# Patient Record
Sex: MALE
[De-identification: names, ages, dates, MRNs, and addresses within clinical notes are randomized per-mention and may not be internally consistent; named-entity substitution may affect disease eponyms.]

---

## 2020-01-10 ENCOUNTER — HOSPITAL ENCOUNTER (INPATIENT)
Dept: HOSPITAL 73 - ED | Age: 70
LOS: 1 days | Discharge: ANOTHER HOSPITAL | DRG: 65 | End: 2020-01-11
Payer: COMMERCIAL

## 2020-01-10 VITALS
HEART RATE: 68 BPM | OXYGEN SATURATION: 97 % | SYSTOLIC BLOOD PRESSURE: 159 MMHG | DIASTOLIC BLOOD PRESSURE: 60 MMHG | RESPIRATION RATE: 17 BRPM

## 2020-01-10 VITALS
SYSTOLIC BLOOD PRESSURE: 193 MMHG | HEART RATE: 75 BPM | OXYGEN SATURATION: 100 % | RESPIRATION RATE: 16 BRPM | DIASTOLIC BLOOD PRESSURE: 79 MMHG

## 2020-01-10 VITALS
RESPIRATION RATE: 14 BRPM | SYSTOLIC BLOOD PRESSURE: 159 MMHG | HEART RATE: 68 BPM | OXYGEN SATURATION: 97 % | DIASTOLIC BLOOD PRESSURE: 60 MMHG

## 2020-01-10 VITALS
SYSTOLIC BLOOD PRESSURE: 164 MMHG | DIASTOLIC BLOOD PRESSURE: 54 MMHG | RESPIRATION RATE: 17 BRPM | HEART RATE: 66 BPM | OXYGEN SATURATION: 98 %

## 2020-01-10 VITALS
SYSTOLIC BLOOD PRESSURE: 213 MMHG | DIASTOLIC BLOOD PRESSURE: 85 MMHG | TEMPERATURE: 98.42 F | RESPIRATION RATE: 12 BRPM | HEART RATE: 74 BPM | OXYGEN SATURATION: 100 %

## 2020-01-10 VITALS — HEART RATE: 65 BPM | OXYGEN SATURATION: 99 % | DIASTOLIC BLOOD PRESSURE: 70 MMHG | SYSTOLIC BLOOD PRESSURE: 188 MMHG

## 2020-01-10 VITALS — HEART RATE: 68 BPM | DIASTOLIC BLOOD PRESSURE: 75 MMHG | SYSTOLIC BLOOD PRESSURE: 174 MMHG

## 2020-01-10 VITALS — BODY MASS INDEX: 20.9 KG/M2

## 2020-01-10 VITALS
HEART RATE: 69 BPM | SYSTOLIC BLOOD PRESSURE: 176 MMHG | OXYGEN SATURATION: 99 % | DIASTOLIC BLOOD PRESSURE: 79 MMHG | RESPIRATION RATE: 16 BRPM

## 2020-01-10 VITALS
TEMPERATURE: 98.1 F | DIASTOLIC BLOOD PRESSURE: 71 MMHG | OXYGEN SATURATION: 98 % | HEART RATE: 64 BPM | RESPIRATION RATE: 17 BRPM | SYSTOLIC BLOOD PRESSURE: 144 MMHG

## 2020-01-10 VITALS — DIASTOLIC BLOOD PRESSURE: 89 MMHG | HEART RATE: 78 BPM | SYSTOLIC BLOOD PRESSURE: 210 MMHG

## 2020-01-10 VITALS — OXYGEN SATURATION: 100 % | SYSTOLIC BLOOD PRESSURE: 220 MMHG | DIASTOLIC BLOOD PRESSURE: 76 MMHG | HEART RATE: 74 BPM
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
## 2020-01-10 DIAGNOSIS — D50.9: ICD-10-CM

## 2020-01-10 DIAGNOSIS — I16.1: ICD-10-CM

## 2020-01-10 DIAGNOSIS — I12.9: ICD-10-CM

## 2020-01-10 DIAGNOSIS — I63.233: Primary | ICD-10-CM

## 2020-01-10 DIAGNOSIS — R47.1: ICD-10-CM

## 2020-01-10 DIAGNOSIS — R29.810: ICD-10-CM

## 2020-01-10 DIAGNOSIS — E78.5: ICD-10-CM

## 2020-01-10 DIAGNOSIS — N17.9: ICD-10-CM

## 2020-01-10 DIAGNOSIS — F17.210: ICD-10-CM

## 2020-01-10 DIAGNOSIS — R29.704: ICD-10-CM

## 2020-01-10 DIAGNOSIS — J44.1: ICD-10-CM

## 2020-01-10 DIAGNOSIS — G81.91: ICD-10-CM

## 2020-01-10 DIAGNOSIS — N18.2: ICD-10-CM

## 2020-01-10 LAB
ADD MANUAL DIFF / SLIDE REVIEW: NO
BUN SERPL-MCNC: 16 MG/DL (ref 9–20)
CALCIUM SERPL-MCNC: 9.6 MG/DL (ref 8.4–10.2)
CHLORIDE SERPL-SCNC: 98 MMOL/L (ref 98–107)
CO2 SERPL-SCNC: 28 MMOL/L (ref 22–32)
ESTIMATED GLOMERULAR FILT RATE: 50 ML/MIN (ref 60–?)
GLUCOSE SERPL-MCNC: 112 MG/DL (ref 80–110)
HBA1C MFR BLD: 5.5 % (ref 4–6)
HEMATOCRIT: 40.2 % (ref 41–53)
HEMOGLOBIN: 13.1 G/DL (ref 13.5–17.5)
HEMOLYSIS: < 15 (ref 0–50)
INR PPP: 1 (ref 0.9–1.3)
LYMPHOCYTES # SPEC AUTO: 1800 /UL (ref 1100–4500)
MCV RBC: 69 FL (ref 80–100)
MEAN CORPUSCULAR HEMOGLOBIN: 22.5 PG (ref 26–34)
MEAN CORPUSCULAR HGB CONC: 32.7 % (ref 30–36)
MICROCYTOSIS: (no result)
PLATELET COUNT: 231 X10^3/UL (ref 150–400)
POTASSIUM SERPL-SCNC: 3.7 MMOL/L (ref 3.4–5.1)
PROTHROMBIN TIME: 12 SECONDS (ref 10.1–12.7)
PTT PARTIAL THROMBOPLASTIN TIM: 68 SECONDS (ref 26.4–36.2)
SODIUM SERPL-SCNC: 137 MMOL/L (ref 137–145)

## 2020-01-10 PROCEDURE — 70548 MR ANGIOGRAPHY NECK W/DYE: CPT

## 2020-01-10 PROCEDURE — 83036 HEMOGLOBIN GLYCOSYLATED A1C: CPT

## 2020-01-10 PROCEDURE — 80048 BASIC METABOLIC PNL TOTAL CA: CPT

## 2020-01-10 PROCEDURE — 96374 THER/PROPH/DIAG INJ IV PUSH: CPT

## 2020-01-10 PROCEDURE — 36415 COLL VENOUS BLD VENIPUNCTURE: CPT

## 2020-01-10 PROCEDURE — 93306 TTE W/DOPPLER COMPLETE: CPT

## 2020-01-10 PROCEDURE — 85610 PROTHROMBIN TIME: CPT

## 2020-01-10 PROCEDURE — 97165 OT EVAL LOW COMPLEX 30 MIN: CPT

## 2020-01-10 PROCEDURE — 82746 ASSAY OF FOLIC ACID SERUM: CPT

## 2020-01-10 PROCEDURE — 80061 LIPID PANEL: CPT

## 2020-01-10 PROCEDURE — 70496 CT ANGIOGRAPHY HEAD: CPT

## 2020-01-10 PROCEDURE — 71046 X-RAY EXAM CHEST 2 VIEWS: CPT

## 2020-01-10 PROCEDURE — 82962 GLUCOSE BLOOD TEST: CPT

## 2020-01-10 PROCEDURE — 99291 CRITICAL CARE FIRST HOUR: CPT

## 2020-01-10 PROCEDURE — 96361 HYDRATE IV INFUSION ADD-ON: CPT

## 2020-01-10 PROCEDURE — 70498 CT ANGIOGRAPHY NECK: CPT

## 2020-01-10 PROCEDURE — 85730 THROMBOPLASTIN TIME PARTIAL: CPT

## 2020-01-10 PROCEDURE — 92610 EVALUATE SWALLOWING FUNCTION: CPT

## 2020-01-10 PROCEDURE — 92522 EVALUATE SPEECH PRODUCTION: CPT

## 2020-01-10 PROCEDURE — 93005 ELECTROCARDIOGRAM TRACING: CPT

## 2020-01-10 PROCEDURE — 85025 COMPLETE CBC W/AUTO DIFF WBC: CPT

## 2020-01-10 PROCEDURE — 82728 ASSAY OF FERRITIN: CPT

## 2020-01-10 PROCEDURE — 84466 ASSAY OF TRANSFERRIN: CPT

## 2020-01-10 PROCEDURE — 99285 EMERGENCY DEPT VISIT HI MDM: CPT

## 2020-01-10 PROCEDURE — 82607 VITAMIN B-12: CPT

## 2020-01-10 PROCEDURE — 70553 MRI BRAIN STEM W/O & W/DYE: CPT

## 2020-01-10 PROCEDURE — 70450 CT HEAD/BRAIN W/O DYE: CPT

## 2020-01-10 PROCEDURE — 83540 ASSAY OF IRON: CPT

## 2020-01-10 PROCEDURE — 99406 BEHAV CHNG SMOKING 3-10 MIN: CPT

## 2020-01-10 RX ADMIN — ASPIRIN 324 MG: 81 TABLET, CHEWABLE ORAL at 16:49

## 2020-01-10 RX ADMIN — ROSUVASTATIN CALCIUM 20 MG: 10 TABLET, FILM COATED ORAL at 22:51

## 2020-01-10 RX ADMIN — LABETALOL HYDROCHLORIDE 10 MG: 5 INJECTION, SOLUTION INTRAVENOUS at 17:22

## 2020-01-10 RX ADMIN — SODIUM CHLORIDE 100 ML: 0.9 INJECTION, SOLUTION INTRAVENOUS at 22:51

## 2020-01-10 RX ADMIN — SODIUM CHLORIDE 150 ML: 0.9 INJECTION, SOLUTION INTRAVENOUS at 16:49

## 2020-01-10 NOTE — DI.MRI.S_ITS
"PROCEDURE:  MR STROKE  
Pre- and post-contrast brain MRI, non-contrast brain MR angiogram, pre- and postcontrast   
neck MR angiogram  
   
INDICATIONS:  right side weakness r/o cva  
   
TECHNIQUE:    
Brain:  Noncontrast axial T1 spin echo, axial T2 fast spin echo, sagittal and axial   
FLAIR, coronal T2 fast spin echo, axial gradient echo, axial diffusion and ADC through   
the brain.  After the administration of contrast, axial 3D VIBE of the cranial   
vasculature and brain.    
Brain MRA:  Non-contrast 3-D time of flight MR angiogram, with multiple   
maximum-intensity-projection (MIP) reformats performed.    
Neck MRA:  Axial and sagittal TruFISP through the neck.  Coronal dynamic MR angiogram   
during administration of contrast in the arterial and venous phases, with 3-dimenstional   
maximum-intensity-projection (MIP) reformats constructed from subtraction images.    
   
COMPARISON:  [facility], CT, CT ANGIO HEAD AND NECK, 1/10/2020, 17:31.  [facility], CT, CT HEAD/BRAIN WO CON, 1/10/2020, 16:24.  
   
FINDINGS:    
Image quality:  Excellent.    
   
BRAIN:    
CSF spaces:  Ventricles are normal in size and shape.  Basal cisterns are patent.  No   
extra-axial fluid collections.    
Brain:  No intracranial bleeds or mass effects.  Gray-white matter interface is normal.    
Scattered small foci of increased flair signal are identified within the deep white   
matter and periventricular white matter of the supratentorial brain.  An area of   
increased signal on the diffusion images is identified involving the left basal ganglia   
and paraventricular white matter of the left frontal lobe.  Corresponding decreased   
signal intensity on the ADC images is present.  No additional areas of diffusion   
restriction are identified.  e Brainstem appears normal.  Normal intravascular flow voids   
are present.  No abnormal intracranial enhancement.    
Skull and face:  Calvarial marrow signal is normal.  Orbits appear normal.    
Sinuses: A small amount of fluid is present within the left mastoid air cells.  Mucosal   
thickening of the left maxillary sinus is present without an air-fluid level.  There may   
be mild ethmoid sinus mucosal thickening.  Otherwise, the imaged paranasal sinuses and   
right mastoid air cells are clear.  
   
BRAIN MR ANGIOGRAM:    
Anterior circulation:  Intracranial internal carotid arteries are normal in size and   
enhancement.  The flow within the paired anterior cerebral arteries is normal and   
symmetric.  The flow within the middle cerebral arteries is normal and symmetric.  The   
anterior communicating artery is seen.  No stenoses, occlusions, or aneurysms.    
Posterior circulation:  The visualized portions of the vertebral arteries demonstrate   
normal caliber, and join to form a normal appearing basilar artery.  The left vertebral   
artery is dominant.  The flow within the posterior cerebral arteries is normal and   
symmetric.  No stenoses, occlusions, or aneurysms.    
   
NECK MR ANGIOGRAM:    
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   
arch of the aorta.  Moderate atherosclerotic plaque is evident involving the proximal   
aspect of this artery with approximately 50% narrowing of the lumen of the vessel.  The   
bilateral common carotid arteries are normal in course and caliber and demonstrate   
minimal atherosclerosis at their origins.  There is high-grade narrowing involving the   
proximal aspect of the right carotid bulb with greater than 75% narrowing of the lumen of   
the vessel.  Additional areas of mild luminal narrowing involving the proximal right   
internal carotid artery is evident.  Otherwise, the mid and distal portions of the right   
internal carotid artery are patent and unremarkable.  There is also mild atherosclerotic   
irregularity involving the left carotid bulb with approximately 50% narrowing of the   
lumen of the vessel at the o
[id number]|2020-01-11 13:41:00|2020-01-11 13:41:00|MAGEN_ITS|REGINE|Health Information Management|[id number]|"History of Present Illness

## 2020-01-10 NOTE — ED_ITS
"HPI - Neuro Symptoms/Deficit    
<Alok Stokes DO - Last Filed: 01/11/20 08:05>    
General    
Chief Complaint: Neuro Symptoms/Deficit    
Stated Complaint: STROKE    
Time Seen by Provider: 01/10/20 16:23    
Source: patient and family    
Mode of arrival: Family Vehicle    
Limitations: no limitations    
History of Present Illness    
HPI Narrative: 69-year-old male smoker with history of hypertension presents   
with a chief complaint of stroke-like symptoms since about 5:00 a.m. this   
morning.  He states he woke up and felt a bit off and fell because his right leg  
was weak.  He does not take any blood thinners and denies any pain.  Over the   
course of the day patient has had persistent right-sided facial weakness with s  
lurred speech and right upper and lower extremity weakness and numbness.    
Presents with his wife at about 4:30 p.m. (well beyond the tPA window) with a   
chief complaint of weakness and slurred speech.  He denies any history of stroke  
or TIA. +BEFAST but well outside window (No TPA), LAMS (2) no Code IR. Taken   
directly to CT    
Onset (ago): hour(s)    
Last Observed Normal: 05:00    
Timing confirmed by: spouse    
Location: speech, right face, right arm and right leg    
Severity: moderate    
Quality: weak and numb    
Relieving factors: none    
Exacerbating factors: none    
Context: sudden onset    
On Anticoagulants: No    
Associated symptoms: denies other symptoms    
Related Data    
                                Home Medications    
    
    
    
 Medication  Instructions  Recorded  Confirmed    
     
No Known Home Medications  01/11/20 01/11/20    
    
    
    
                                    Allergies    
    
    
    
Allergy/AdvReac Type Severity Reaction Status Date / Time    
     
No Known Drug Allergies Allergy   Verified 01/10/20 16:40    
    
    
    
    
Review of Systems    
<Alok Stokes DO - Last Filed: 01/11/20 08:05>    
Constitutional    
Constitutional: Denies chills, Denies fatigue, Denies fever(s), Denies frequent   
falls, Denies lethargy and Reports weakness    
Eyes    
Eyes: Denies change in vision, Denies eye discharge, Denies irritation and   
Denies loss of vision    
ENT    
Ears, Nose, Mouth, and Throat: Denies change in voice, Denies dizziness, Denies   
neck pain, Denies sore throat and Denies throat swelling    
Cardiovascular    
Cardiovascular: Denies chest pain, Denies irregular heart rhythm, Denies   
lightheadedness, Denies palpitations, Denies dyspnea, Denies dyspnea on exertion  
and Denies orthopnea    
Respiratory    
Respiratory: Denies cough, Denies dyspnea, Denies dyspnea on exertion and Denies  
wheezing    
Gastrointestinal    
Gastrointestinal: Denies abdominal pain, Denies change in bowel habits, Denies   
diarrhea, Denies nausea and Denies vomiting    
Genitourinary    
Genitourinary: Denies hematuria, Denies flank pain, Denies urinary incontinence   
and Denies urinary urgency    
Musculoskeletal    
Musculoskeletal: Reports abnormal gait, Denies back pain, Denies muscle we  
akness, Denies neck pain, Denies numbness and Denies tingling    
Integumentary/Breasts    
Skin/Breast: Denies pruritus, Denies erythema, Denies rash and Denies wounds    
Neurologic    
Neurologic: Reports abnormal gait, Denies behavioral changes, Denies confusion,   
Denies dizziness, Denies frequent falls, Reports focal weakness, Denies loss of   
vision, Denies numbness, Denies tingling, Reports paresthesias and Reports   
weakness    
Psychiatric    
Psychiatric: Denies anxiety, Denies behavioral changes, Denies confusion, Denies  
depression, Denies homicidal ideation and Denies suicidal ideation    
Endocrine    
Endocrine: Denies fatigue, Denies flushing and Denies palpitations    
Hematologic/Lymphatic    
Hematologic/Lymphatic: Denies easy bruising    
Allergic/Immunologic    
Allergic/Immunologic: Denies urticaria, Denies throat swel
[id number]|2020-01-10 16:28:00|2020-01-10 16:43:00|DI.CT.S_ITS|[facility]|Imaging|[id number]|"PROCEDURE:  CT HEAD/BRAIN WO CON

## 2020-01-10 NOTE — DI.ECHO.S_ITS
[facility]  
+---------+                        [facility]                        +---------+  
:         :                      [address]                     :         :  
:         :                      [address]                     :         :  
:         :                          [id number]                         :         :  
:         :                       Phone: [phone]                      :         :  
+---------+                        [phone]                        +---------+  
                             Echocardiogram Report  
+-----------------------------------------------------------------------------+  
:Name: HARVINDER ALEXANDER             Study Date: 2020        Height: 71 in  :  
:[facility] MRN #: [id number]                                    Weight: 150 lb :  
:Account #: [id number]           Gender: Male                  BSA: 1.9 m2    :  
:: 1950                 Age: 69 yrs                   BP: 165/83 mmHg:  
:Reason For Study: CVA                                                        :  
:Ordering Physician: [facility]                                                   :  
:Hospitalist                     Performed By: Mitchell Stanley                   :  
:Referring: BHARATHI PACHECO                                                  :  
+-----------------------------------------------------------------------------+  
Interpretation Summary  
The left ventricle is normal in size. There is moderate concentric left  
ventricular hypertrophy. Left ventricular systolic function is normal. The  
ejection fraction is estimated to be 60-65%. Left ventricular wall motion is  
normal. LVEF has not changed.  
   
The right ventricle is normal in size and function. Pulmonary artery pressures  
cannot be estimated because of the lack of a measurable TR jet velocity.  
   
The left atrium is severely dilated. The right atrium is mildly dilated. The  
interatrial septum is intact with no evidence for an atrial septal defect.  
Injection of contrast documented no interatrial shunt.  
   
There is mild aortic stenosis. The calculated aortic valve area is 1.6 cm2.  
The peak aortic velocity is 2.9 m/sec. AS has not changed. There is mild to  
moderate aortic regurgitation. AR has mildly increased since prior study.  
There is no other significant valvular heart disease.  
   
The aortic root is normal size.  
   
No obvious source for cardioembolic TIA/CVA.  
   
   
Procedure:   A two-dimensional transthoracic echocardiogram with color flow  
and Doppler was performed. The study quality was technically good. A saline  
contrast injection was performed to assess for cardiac shunting. Prior echo  
performed on 18. The patient was in normal sinus rhythm during the exam.  
Left Ventricle:   The left ventricle is normal in size. There is moderate  
concentric left ventricular hypertrophy. Left ventricular systolic function is  
normal. The ejection fraction is estimated to be 60-65%. Left ventricular wall  
motion is normal. Diastolic function could not be accurately assessed due to  
contradictory data.  
Right Ventricle:   The right ventricle is normal in size and function.  
Atria:   The left atrium is severely dilated. The right atrium is mildly  
dilated. The interatrial septum is intact with no evidence for an atrial  
septal defect. Injection of contrast documented no interatrial shunt.  
Mitral Valve:   The mitral valve leaflets appear mildly thickened, but open  
well. There is trace mitral regurgitation.  
Aortic Valve:   Moderately thickened and calcified aortic valve leaflets. The  
aortic valve is trileaflet. There is mild aortic stenosis. The calculated  
aortic valve area is 1.6 cm2. The peak aortic velocity is 2.9 m/sec. The  
aortic valve mean gradient is 16 mmHg. There is mild to moderate aortic  
regurgitation.  
Tricuspid Valve:   The tricuspid valve is normal in structure and function.  
There is 
[id number]|2020 14:38:02|2020 14:38:02|STRODRIGO||||

## 2020-01-10 NOTE — DI.RAD.S_ITS
PROCEDURE:  XR CHEST 2V  
   
INDICATIONS:  cough, bronchospasm  
   
TECHNIQUE:  2 views of the chest were acquired.    
   
COMPARISON:  [facility], , CHEST 2 VIEW, 12/08/2008, 10:44.  
   
FINDINGS:    
   
Surgical changes and devices:  None.    
   
Lungs and pleura: Prominent perihilar interstitial markings are identified without   
definite area of lmonary consolidation.  No effusion or pneumothorax is evident.  Mild   
pulmonary hyperexpansion may represent chronic obstructive pulmonary disease.  
   
Mediastinum:  Mediastinal contours are normal.  Heart size is normal.  There is aortic   
atherosclerosis.  
   
Bones and chest wall:  No suspicious bony abnormalities.  Soft tissues appear   
unremarkable.    
   
IMPRESSION: Perihilar interstitial prominence is nonspecific, but may represent   
interstitial pneumonia versus pulmonary edema.  Please correlate clinically.  
   
   
   
Dictated by: Kaushik Rich M.D. on 1/11/2020 at 6:21       
Approved by: Kaushik Rich M.D. on 1/11/2020 at 6:22

## 2020-01-10 NOTE — P.HP_ITS
"History of Present Illness    
History of Present Illness    
Date Patient Seen: 01/10/20    
Time Patient Seen: 20:15    
Chief complaint: STROKE    
Narrative: The patient is a 69-year-old  male with PMH of HTN,   
hyperlipidemia, and tobacco dependence.    
    
Patient presented to the ED and was triaged on 01/10/2020 at 4:25 pm.  Patient   
presented out of concern for slurred speech, right facial droop, and right-sided  
weakness.  Last seen well on the evening of 1/9/ 2020, prior to bedtime.    
Patient's presentation to the ED from last known normal was beyond 4.5 hours.    
Patient reports waking up the following morning of 1/10 at 4:00 am,   
respectively, feeling not like himself, but without any particular concerns.  As  
he attempted to proceed with his day he noticed difficulty with balance and   
coordination.  He recalls preparing himself a cup of coffee; however, had   
difficulty steering the coffee with his right upper extremity.  Patient is   
right-hand dominant.  He went on with his day.  Later in the morning reports   
difficulty with expressive speech.  Denies difficulty thinking of what he wants   
to say.  Reports developing right lower extremity weakness.  Notes his leg giv  
ing out as he tried to walk, which caused a ground level fall.  There was no   
injury to the head or loss of consciousness.  Patient's wife left for work early  
in the morning, when she returned at approximately 3:00 pm she noticed patient   
having significant difficulty with expressive speech and right facial droop.    
This was concerning and patient was brought to the ED for further evaluation.    
    
Patient denies headache, change in vision, dizziness, and lightheadedness.    
Denies recent syncopal events.  Denies chest pain and palpitations.  He has not   
experienced dyspnea at rest or with exertion.  Denies orthopnea and peripheral   
edema.  Endorses bilateral hand paresthesia at baseline.  Denies chronic pain.    
    
Patient reports history hypertension for the past 2 years.  Patient states that   
he was prescribed antihypertensive agents, however felt that they were giving   
him muscle cramps and shortly after stop taking them.  Patient is unable to   
recall if he was previously diagnosed with dyslipidemia or has taken any lipid   
lowering agents.  He does not take aspirin, other anti thrombotic agents, or a  
nticoagulation agents.  Patient admits to a lifelong history of tobacco   
dependence (at least 50 years).  Currently smokes 1/2 ppd.  Endorses smoking up   
to 4 packs per day during his divorce.  Patient denies prior history of MI,   
CVA/TIA, or PE/DVT.    
    
Patient is not in the habit of maintaining routine preventive health care.  At   
present time he does not have a PCP.    
    
ED Presentation and Work-Up    
VS.  T 98.4F  /85  HR 74  RR 12  SpO2 100%    
GCS 15   LAMS score 5   NIHSS 5 (facial palsy 1,  right arm drift, right leg   
drift 1, language / aphasia 1, dysarthria 1)    
    
CT HEAD.  No intracranial bleeds or masses.  Mild cerebral volume loss and   
chronic microvascular ischemic changes.  Intracranial internal carotid artery   
atherosclerosis.    
    
HEAD CTA    
Intracranial ICA  normal in size and flow.  Flow w/in paired anterior cerebral   
artery is normal and symmetric.  Flow w/in  middle cerebral artery is normal and  
symmetric.  Anterior communicating artery is seen.  No aneurysms.    
Visualized portions of the vertebral arteries demonstrate normal caliber, and   
joint to form a normal appearing basilar artery.  Flow w/in  posterior cerebral   
arteries is normal and symmetric.  No aneurysms.    
    
NECK CTA    
Moderate stenosis at the origin of the left common carotid artery. The common   
carotid arteries demonstrate normal caliber and courses. Near occlusive stenosis  
is present at the origin of the right internal carotid artery. Greater than 50%   
stenosis is present at t
[id number]|2020-01-10 17:28:00|2020-01-10 18:15:00|DI.CT.S_ITS|[facility]|Imaging|[id number]|"PROCEDURE:  CT ANGIO HEAD AND NECK

## 2020-01-10 NOTE — ED.NEUROSD
"HPI - Neuro Symptoms/Deficit
<Alok Stokes DO - Last Filed: 01/11/20 08:05>
General
Chief Complaint: Neuro Symptoms/Deficit
Stated Complaint: STROKE
Time Seen by Provider: 01/10/20 16:23
Source: patient and family
Mode of arrival: Family Vehicle
Limitations: no limitations
History of Present Illness
HPI Narrative: 69-year-old male smoker with history of hypertension presents with a chief complaint of stroke-like symptoms since about 5:00 a.m. this morning.  He states he woke up and felt a bit off and fell because his right leg was weak.  He 
does not take any blood thinners and denies any pain.  Over the course of the day patient has had persistent right-sided facial weakness with slurred speech and right upper and lower extremity weakness and numbness.  Presents with his wife at about 
4:30 p.m. (well beyond the tPA window) with a chief complaint of weakness and slurred speech.  He denies any history of stroke or TIA. +BEFAST but well outside window (No TPA), LAMS (2) no Code IR. Taken directly to CT
Onset (ago): hour(s)
Last Observed Normal: 05:00
Timing confirmed by: spouse
Location: speech, right face, right arm and right leg
Severity: moderate
Quality: weak and numb
Relieving factors: none
Exacerbating factors: none
Context: sudden onset
On Anticoagulants: No
Associated symptoms: denies other symptoms
Related Data
Home Medications

 Medication  Instructions  Recorded  Confirmed
No Known Home Medications  01/11/20 01/11/20


Allergies

Allergy/AdvReac Type Severity Reaction Status Date / Time
No Known Drug Allergies Allergy   Verified 01/10/20 16:40



Review of Systems
<DO Julissa Borrego Last Filed: 01/11/20 08:05>
Constitutional
Constitutional: Denies chills, Denies fatigue, Denies fever(s), Denies frequent falls, Denies lethargy and Reports weakness
Eyes
Eyes: Denies change in vision, Denies eye discharge, Denies irritation and Denies loss of vision
ENT
Ears, Nose, Mouth, and Throat: Denies change in voice, Denies dizziness, Denies neck pain, Denies sore throat and Denies throat swelling
Cardiovascular
Cardiovascular: Denies chest pain, Denies irregular heart rhythm, Denies lightheadedness, Denies palpitations, Denies dyspnea, Denies dyspnea on exertion and Denies orthopnea
Respiratory
Respiratory: Denies cough, Denies dyspnea, Denies dyspnea on exertion and Denies wheezing
Gastrointestinal
Gastrointestinal: Denies abdominal pain, Denies change in bowel habits, Denies diarrhea, Denies nausea and Denies vomiting
Genitourinary
Genitourinary: Denies hematuria, Denies flank pain, Denies urinary incontinence and Denies urinary urgency
Musculoskeletal
Musculoskeletal: Reports abnormal gait, Denies back pain, Denies muscle weakness, Denies neck pain, Denies numbness and Denies tingling
Integumentary/Breasts
Skin/Breast: Denies pruritus, Denies erythema, Denies rash and Denies wounds
Neurologic
Neurologic: Reports abnormal gait, Denies behavioral changes, Denies confusion, Denies dizziness, Denies frequent falls, Reports focal weakness, Denies loss of vision, Denies numbness, Denies tingling, Reports paresthesias and Reports weakness
Psychiatric
Psychiatric: Denies anxiety, Denies behavioral changes, Denies confusion, Denies depression, Denies homicidal ideation and Denies suicidal ideation
Endocrine
Endocrine: Denies fatigue, Denies flushing and Denies palpitations
Hematologic/Lymphatic
Hematologic/Lymphatic: Denies easy bruising
Allergic/Immunologic
Allergic/Immunologic: Denies urticaria, Denies throat swelling and Denies wheezing

Patient History
<Alok Stokes,  - Last Filed: 01/11/20 08:05>
Medical History (Updated 01/11/20 @ 06:05 by INEZ Bang)

Dyslipidemia (Chronic)
Echocardiogram abnormal (Chronic)
Essential hypertension (Chronic)
Hand paresthesia (Chronic)
Tobacco dependence (Chronic)


Surgical History (Updated 01/11/20 @ 06:20 by INEZ Bang)

H/O removal of cyst (Acute)
Male circumcision (Acute)


Fami
[id number]|2020-01-10 21:35:09|2020-01-10 21:35:09|PM.HP.1||||"History of Present Illness

## 2020-01-11 VITALS
OXYGEN SATURATION: 96 % | RESPIRATION RATE: 18 BRPM | TEMPERATURE: 98.2 F | DIASTOLIC BLOOD PRESSURE: 83 MMHG | SYSTOLIC BLOOD PRESSURE: 165 MMHG | HEART RATE: 71 BPM

## 2020-01-11 VITALS
HEART RATE: 66 BPM | TEMPERATURE: 98.24 F | OXYGEN SATURATION: 96 % | DIASTOLIC BLOOD PRESSURE: 70 MMHG | RESPIRATION RATE: 16 BRPM | SYSTOLIC BLOOD PRESSURE: 133 MMHG

## 2020-01-11 VITALS
SYSTOLIC BLOOD PRESSURE: 150 MMHG | OXYGEN SATURATION: 97 % | TEMPERATURE: 97.7 F | RESPIRATION RATE: 16 BRPM | HEART RATE: 72 BPM | DIASTOLIC BLOOD PRESSURE: 74 MMHG

## 2020-01-11 LAB
ADD MANUAL DIFF / SLIDE REVIEW: NO
BUN SERPL-MCNC: 13 MG/DL (ref 9–20)
CALCIUM SERPL-MCNC: 9 MG/DL (ref 8.4–10.2)
CHLORIDE SERPL-SCNC: 102 MMOL/L (ref 98–107)
CHOLEST SERPL-MCNC: 200 MG/DL (ref 140–199)
CO2 SERPL-SCNC: 27 MMOL/L (ref 22–32)
ESTIMATED GLOMERULAR FILT RATE: 50 ML/MIN (ref 60–?)
FERRITIN SERPL-MCNC: 381 NG/ML (ref 17.9–464)
FOLATE SERPL-MCNC: > 20 NG/ML (ref 2.76–20)
GLUCOSE SERPL-MCNC: 96 MG/DL (ref 80–110)
HDLC SERPL-MCNC: 32 MG/DL (ref 40–60)
HEMATOCRIT: 36.5 % (ref 41–53)
HEMOGLOBIN: 12.1 G/DL (ref 13.5–17.5)
HEMOLYSIS: < 15 (ref 0–50)
IRON SERPL-MCNC: 110 UG/DL (ref 49–181)
LYMPHOCYTES # SPEC AUTO: 1200 /UL (ref 1100–4500)
MCV RBC: 67.9 FL (ref 80–100)
MEAN CORPUSCULAR HEMOGLOBIN: 22.6 PG (ref 26–34)
MEAN CORPUSCULAR HGB CONC: 33.2 % (ref 30–36)
MICROCYTOSIS: (no result)
PLATELET COUNT: 208 X10^3/UL (ref 150–400)
POTASSIUM SERPL-SCNC: 3.7 MMOL/L (ref 3.4–5.1)
SODIUM SERPL-SCNC: 138 MMOL/L (ref 137–145)
TARGETS BLD QL SMEAR: (no result)
TRANSFERRIN SERPL-MCNC: 217 MG/DL (ref 206–381)
TRIGL SERPL-MCNC: 116 MG/DL (ref 35–150)
VIT B12 SERPL-MCNC: 514 PG/ML (ref 239–931)

## 2020-01-11 RX ADMIN — Medication 10 ML: at 11:40

## 2020-01-11 RX ADMIN — ASPIRIN 162 MG: 81 TABLET, COATED ORAL at 11:38

## 2020-01-11 RX ADMIN — HEPARIN SODIUM 5000 UNIT: 5000 INJECTION, SOLUTION INTRAVENOUS; SUBCUTANEOUS at 06:03

## 2020-01-11 NOTE — PC.NURSE
Patient is alert and oriented.  Breath sounds CTA with RA sat of 97%.  HRR with 0000 telemetry reading of SR.  Denies nausea.  BT present and abdomen is soft.  Has not yet voided this shift and noted to have not voided since arrival in ER.  Is able 
to turn self in bed.  Reportedly is unsteady on feet and reports 2 falls prior to admission.  NIH is 2 due to some slurring of words.  Passed swallow eval but is NPO for tests in a.m.  CBG was 81.  Fall risk score is high and bed alarm is activated.

## 2020-01-11 NOTE — ST.IPCSEOM
"

Visit Care Team          Role                          Provider Type
Vinayak Coleman DO
                         Emergency Provider            Physician
     Specialty:     Emergency Medicine
     Address:       [address] [place] [address], [address]
     Phone:         [phone]
     Fax:           [phone]
     Email:         [email or web address]


INEZ Bang
                         Admit Provider                Advanced Practice Clinician
                         Attending Provider
     Specialty:     Internal Medicine
     Address:       [address] [place] [address], [address]
     Phone:         [phone]
     Fax:           [phone]
     Email:         [email or web address]




Past Medical History (Last Updated 01/11/20 @ 06:05 by INEZ Bang)

Dyslipidemia (Chronic Medical)
Echocardiogram abnormal (Chronic Medical)
 Echo 1/12/2018
 LVEF 60-65%.   mild concentric LVH.Moderately dilated left atrium.  Mild aortic stenosis with mild aortic regurgitation.  Mildly enlarged ascending aorta.
Essential hypertension (Chronic Medical)
Hand paresthesia (Chronic Medical)
Tobacco dependence (Chronic Medical)
 1/2 ppd x59 years, up to 4 ppd when going through divorce

Speech-Language Pathology Swallow Evaluation

SL[facility] Clinical Swallow Evaluation                            Start:  01/11/20 14:08
Freq:                                                      Status: Active        
Protocol:                                                                        
 Document     01/11/20 14:08  MG  (Rec: 01/11/20 14:23  MG  [id number])
 Clinical Swallow Evaluation
     Session Time
      Visit Start Time                           13:40
      Visit Stop Time                            13:50
      Total Visit Minutes                        10
     Visit Information
      Visit Number                               1
     Setting
      Assessment Location                        Acute Care
     Visit Type
      Note Type                                  Initial Evaluation
     Patient Information
      Identification Type                        Name,ID Wristband
      History                                    Pt presented to the ED and was
                                                 triaged on 01/10/2020 at 4:25
                                                 pm.  Patient presented out of
                                                 concern for slurred speech,
                                                 right facial droop, and right-
                                                 sided weakness.  Last seen
                                                 well on the evening of 1/9/ 2020, prior to bedtime.
                                                 Patient's presentation to the
                                                 ED from last known normal was
                                                 beyond 4.5 hours.  Patient
                                                 reports waking up the
                                                 following morning of 1/10 at 4
                                                 :00 am, respectively, feeling
                                                 not like himself, but without
                                                 any particular concerns.  As
                                                 he attempted to proceed with
                                                 his day he noticed difficulty
                                                 with balance and coordination.
                                                 He recalls preparing himself
                                                 a cup of coffee; however, had
                                                 difficulty steering the
                                                 coffee with his right upper
                 
[id number]|2020-01-11 13:18:00|2020-01-11 13:18:00|NATALYA||||"DCP Assessment: EMR Reviewed: Patient is a 69 yr old male who was admitted for a frontal lobe Stroke. patient does not currently have a PCP. CM/RN met with patient at the bedside and explained CM/RN role. Patient was not really wanting to interact

## 2020-01-11 NOTE — PM.DS.1
"History of Present Illness
History of Present Illness
Date Patient Seen: 01/10/20
Chief complaint: STROKE
Narrative: 


Written by Gonzalo WILEY: 


The patient is a 69-year-old  male with PMH of HTN, hyperlipidemia, and tobacco dependence.

Patient presented to the ED and was triaged on 01/10/2020 at 4:25 pm.  Patient presented out of concern for slurred speech, right facial droop, and right-sided weakness.  Last seen well on the evening of 1/9/2020, prior to bedtime.  Patient's 
presentation to the ED from last known normal was beyond 4.5 hours.  Patient reports waking up the following morning of 1/10 at 4:00 am, respectively, feeling not like himself, but without any particular concerns.  As he attempted to proceed with 
his day he noticed difficulty with balance and coordination.  He recalls preparing himself a cup of coffee; however, had difficulty steering the coffee with his right upper extremity.  Patient is right-hand dominant.  He went on with his day.  Later 
in the morning reports difficulty with expressive speech.  Denies difficulty thinking of what he wants to say.  Reports developing right lower extremity weakness.  Notes his leg giving out as he tried to walk, which caused a ground level fall.  
There was no injury to the head or loss of consciousness.  Patient's wife left for work early in the morning, when she returned at approximately 3:00 pm she noticed patient having significant difficulty with expressive speech and right facial droop. 
 This was concerning and patient was brought to the ED for further evaluation.

Patient denies headache, change in vision, dizziness, and lightheadedness.  Denies recent syncopal events.  Denies chest pain and palpitations.  He has not experienced dyspnea at rest or with exertion.  Denies orthopnea and peripheral edema.  
Endorses bilateral hand paresthesia at baseline.  Denies chronic pain.

Patient reports history hypertension for the past 2 years.  Patient states that he was prescribed antihypertensive agents, however felt that they were giving him muscle cramps and shortly after stopped taking them.  Patient is unable to recall if he 
was previously diagnosed with dyslipidemia or has taken any lipid lowering agents.  He does not take aspirin, other anti thrombotic agents, or anticoagulation agents.  Patient admits to a lifelong history of tobacco dependence (at least 50 years).  
Currently smokes 1/2 ppd.  Endorses smoking up to 4 packs per day during his divorce.  Patient denies prior history of MI, CVA/TIA, or PE/DVT.

Patient is not in the habit of maintaining routine preventive health care.  At present time he does not have a PCP.

ED Presentation and Work-Up
VS.  T 98.4F  /85  HR 74  RR 12  SpO2 100%
GCS 15   LAMS score 5   NIHSS 5 (facial palsy 1,  right arm drift, right leg drift 1, language / aphasia 1, dysarthria 1)

CT HEAD.  No intracranial bleeds or masses.  Mild cerebral volume loss and chronic microvascular ischemic changes.  Intracranial internal carotid artery atherosclerosis.

HEAD CTA
Intracranial ICA normal in size and flow.  Flow w/in paired anterior cerebral artery is normal and symmetric.  Flow w/in  middle cerebral artery is normal and symmetric.  Anterior communicating artery is seen.  No aneurysms.
Visualized portions of the vertebral arteries demonstrate normal caliber, and joint to form a normal appearing basilar artery.  Flow w/in  posterior cerebral arteries is normal and symmetric.  No aneurysms.

NECK CTA
Moderate stenosis at the origin of the left common carotid artery. The common carotid arteries demonstrate normal caliber and courses. Near occlusive stenosis is present at the origin of the right internal carotid artery. Greater than 50% stenosis 
is present at the origin of the left internal carotid artery. The internal carotid arteries demonstrate normal calibers and courses.

In the ED patient received 1L NS bolus,  mg (1/10 1649), labetalol 10 mg IV (1/10 1722). 
[id number]|2020-01-10 22:04:48|2020-01-10 22:04:48|PC.NURSE||||"Pt to room 216 from E.R. accompanied by Radha VITAL. Pt is able to ambulate few steps from stretcher to bed. Speech is slurred and takes this writer several attempts to understand pt's speech. Able to move all extremities independently. Bed alarm

## 2020-01-11 NOTE — PT-IP ANOTE
checked on pt this at 11 am and pt is eating breakfast and wants to eat first.  checked pt again ~ 1130 am and  imaging tech in with pt and will be doing echo.  will attempt eval this afternoon.

## 2020-01-11 NOTE — PC.NURSE
Addendum entered by Padmaja Vazquez R.N.  01/11/20 14:21: 
[facility] S.O. Radha took pt's cell phone, necklace, watch. Pt kept in his partial dentures and reading glasses.
pt left unit at 1415 via transport stretcher. Radha teary/crying, provided comfort by this RN, CNA and OT. 


Addendum entered by Padmaja Vazquez R.N.  01/11/20 13:57: 
Report called to [facility] at 1350, spoke with DIONNA Condon and patient status update given. Report number is [phone].


Addendum entered by Padmaja Vazquez R.N.  01/11/20 12:59: 
At 1250, pt SBA to BR, was standing at sink brushing his hair using his right arm and pt states  I can't seen to get my arm to do what I want . Clarified with pt that he felt his right arm coordination was slightly impaired, pt still able to perform 
the task of brushing his hair using his right arm.


Addendum entered by Padmaja Vazquez R.N.  01/11/20 12:20: 
Spoke with Dr. Torres around 1130, aware pt passed bedside swallow eval with RN. Okay to have heart Healthy diet. Speech evaluation needs to be done.
Spoke with Dr. Torres around 1220, aware MRI and ECHO are both completed, awaiting Speech evaluation. No further interventions at this time.


Addendum entered by Padmaja Vazquez R.N.  01/11/20 08:53: 
S.O Radha brought in POM bottles. 3 prescription medications, see med list. States pt has taken in over 1 year. Aspirin, Atenolol, Chlorthalidone. Med bottles given back to Radha.


Original Note:

Day Shift-
Pt to MRI via wheelchair at 0845. Pt's SO Radha took pt's watch, necklace and partial dentures while at MRI.
Pt has flat affect, cooperative, Sitting on edge of bed, bed alarm on, slightly unsteady ambulation from bed to BR. Pt able to use urinal indep. IVF S/L'd while pt at MRI.

## 2023-03-08 ENCOUNTER — HOSPITAL ENCOUNTER (OUTPATIENT)
Age: 73
End: 2023-03-08
Payer: MEDICARE

## 2023-03-08 VITALS — BODY MASS INDEX: 20.9 KG/M2

## 2023-03-08 DIAGNOSIS — I35.2: ICD-10-CM

## 2023-03-08 DIAGNOSIS — I34.81: Primary | ICD-10-CM

## 2023-03-08 PROCEDURE — 93306 TTE W/DOPPLER COMPLETE: CPT

## 2023-08-02 ENCOUNTER — HOSPITAL ENCOUNTER (OUTPATIENT)
Age: 73
End: 2023-08-02
Payer: MEDICARE

## 2023-08-02 VITALS — BODY MASS INDEX: 20.9 KG/M2

## 2023-08-02 DIAGNOSIS — M47.812: Primary | ICD-10-CM

## 2023-08-02 DIAGNOSIS — M25.811: ICD-10-CM

## 2023-08-02 DIAGNOSIS — M54.2: ICD-10-CM

## 2023-08-02 PROCEDURE — 72040 X-RAY EXAM NECK SPINE 2-3 VW: CPT

## 2023-08-02 PROCEDURE — 73030 X-RAY EXAM OF SHOULDER: CPT

## 2023-09-15 ENCOUNTER — HOSPITAL ENCOUNTER (OUTPATIENT)
Age: 73
End: 2023-09-15
Payer: MEDICARE

## 2023-09-15 VITALS — BODY MASS INDEX: 20.9 KG/M2

## 2023-09-15 DIAGNOSIS — S43.491A: ICD-10-CM

## 2023-09-15 DIAGNOSIS — M25.811: ICD-10-CM

## 2023-09-15 DIAGNOSIS — M19.011: Primary | ICD-10-CM

## 2023-09-15 PROCEDURE — 73221 MRI JOINT UPR EXTREM W/O DYE: CPT
